# Patient Record
Sex: MALE | Race: WHITE
[De-identification: names, ages, dates, MRNs, and addresses within clinical notes are randomized per-mention and may not be internally consistent; named-entity substitution may affect disease eponyms.]

---

## 2018-01-08 NOTE — NUR
Patient discharged with v/s stable. Written and verbal after care instructions 
given and explained. 

Patient verbalized understanding. Transportation provided by [employer]. Pt exited ER wheel chaired assisted. All questions addressed 
prior to discharge. Advised to follow up with PMD.

## 2018-01-08 NOTE — NUR
Received report from Jenn TOLLIVER. Pt aox3, per report pt at baseline. hx of 
dementia. Pt with no complaints. VSS. NAD. Xray by bedside.

## 2018-01-08 NOTE — NUR
77/M BIBA FROM [facility] S/P MECHANICAL FALL. DENIES LOC/HEAD TRAUMA. ABRASIONS TO LT 
ELBOW AND LT KNEE, LT 2ND TOENAIL IS OFF. GCS 15, AO TO NAME, PLACE. 20RR EVEN 
AND UNLABORED. NO ACUTE DISTRESS AT THIS TIME

PMH: DEMENTIA, HTN

## 2019-01-22 ENCOUNTER — HOSPITAL ENCOUNTER (EMERGENCY)
Dept: HOSPITAL 26 - MED | Age: 79
Discharge: HOME | End: 2019-01-22
Payer: COMMERCIAL

## 2019-01-22 VITALS — HEIGHT: 71 IN | BODY MASS INDEX: 29.68 KG/M2 | WEIGHT: 212 LBS

## 2019-01-22 VITALS — DIASTOLIC BLOOD PRESSURE: 65 MMHG | SYSTOLIC BLOOD PRESSURE: 134 MMHG

## 2019-01-22 VITALS — DIASTOLIC BLOOD PRESSURE: 67 MMHG | SYSTOLIC BLOOD PRESSURE: 145 MMHG
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
## 2019-01-22 DIAGNOSIS — Z79.899: ICD-10-CM

## 2019-01-22 DIAGNOSIS — Z79.82: ICD-10-CM
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
## 2019-01-22 DIAGNOSIS — M79.89: ICD-10-CM

## 2019-01-22 DIAGNOSIS — M25.551: Primary | ICD-10-CM

## 2019-01-22 DIAGNOSIS — F03.90: ICD-10-CM

## 2019-01-22 DIAGNOSIS — I21.9: ICD-10-CM
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
## 2019-01-22 DIAGNOSIS — I10: ICD-10-CM

## 2019-01-22 DIAGNOSIS — Z90.49: ICD-10-CM

## 2019-01-22 PROCEDURE — 99283 EMERGENCY DEPT VISIT LOW MDM: CPT

## 2019-01-22 PROCEDURE — 71045 X-RAY EXAM CHEST 1 VIEW: CPT

## 2019-01-22 PROCEDURE — 96372 THER/PROPH/DIAG INJ SC/IM: CPT

## 2019-01-22 NOTE — NUR
Patient discharged with v/s stable. Written and verbal after care instructions 
given and explained. 

Patient alert, oriented and verbalized understanding of instructions. Ambulance 
Transport with to nursing home. All questions addressed prior to discharge. ID 
band removed. Patient advised to follow up with PMD. Rx of MOTRIN given. 
Patient educated on indication of medication including possible reaction and 
side effects. Opportunity to ask questions provided and answered.

## 2019-01-22 NOTE — NUR
PT IS A 79 Y/O MALE BIB EMS WHO PRESENTS TO THE ED C/O FLU. PT IS FROM 
[facility] AND REPORTS NOT FEELING WELL X3 DAYS. PT REPORTS 7/10 ACHING 
BILATERAL FOOT PAIN, NOTED SWELLING/REDNESS. PT REPORTS COUGH, 98% ON RA, PT 
DENIES CP, SOB, REPORTS NAUSEA DENIES VOMITING/DIARRHEA. PT AWAKE AND ALERT, RR 
EVEN/UNLABORED. PT REPOSITIONED FOR COMFORT, BED IN LOWEST POSITION. ER MD DR. LOPEZ NOTIFIED. WILL CONTINUE TO MONITOR. 



PMH---HEART DZ, HTN 

NKA

## 2019-01-22 NOTE — NUR
CONTACTED [facility]ERRY [facility]ARTY. FACILITY STATES NO ACCESS TO TRANSPORTATION AT THIS 
TIME. FAMILY [facility] CALLED. MESSAGE LEFT.

## 2019-01-22 NOTE — NUR
PT AWAKE IN BED, STATES HE FEELS ANXIOUS ABOUT WANTING TO LEAVE, PT REORIENTED, 
POSITIONED TO COMFORT, WILL CONTINUE TO MONITOR.

## 2019-02-23 ENCOUNTER — HOSPITAL ENCOUNTER (EMERGENCY)
Dept: HOSPITAL 26 - MED | Age: 79
Discharge: HOME | End: 2019-02-23
Payer: COMMERCIAL

## 2019-02-23 VITALS — DIASTOLIC BLOOD PRESSURE: 62 MMHG | SYSTOLIC BLOOD PRESSURE: 115 MMHG

## 2019-02-23 VITALS — DIASTOLIC BLOOD PRESSURE: 55 MMHG | SYSTOLIC BLOOD PRESSURE: 111 MMHG

## 2019-02-23 VITALS — HEIGHT: 71 IN | BODY MASS INDEX: 34.58 KG/M2 | WEIGHT: 247 LBS
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
## 2019-02-23 DIAGNOSIS — Z79.891: ICD-10-CM
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
## 2019-02-23 DIAGNOSIS — M25.511: ICD-10-CM

## 2019-02-23 DIAGNOSIS — Z79.899: ICD-10-CM

## 2019-02-23 DIAGNOSIS — S79.911A: Primary | ICD-10-CM

## 2019-02-23 DIAGNOSIS — Z79.2: ICD-10-CM
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
## 2019-02-23 DIAGNOSIS — Z79.82: ICD-10-CM

## 2019-02-23 DIAGNOSIS — I25.2: ICD-10-CM

## 2019-02-23 DIAGNOSIS — F03.90: ICD-10-CM

## 2019-02-23 DIAGNOSIS — I10: ICD-10-CM
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
## 2019-02-23 DIAGNOSIS — Z79.1: ICD-10-CM

## 2019-02-23 NOTE — NUR
SPOKE WITH TERRY LEE AND INFORMED THEM THAT RUPAL WAS ON HIS WAY BACK IN 
A TAXI AND WOULD NEED TO BE ASSISTED FROM CAB.

## 2019-02-23 NOTE — NUR
SPOKE WITH JEOVANNY RENEE, PATIENTS LISTED NEXT OF KIN, HE STATED HE COULD NOT 
PROVIDE TRANSPORT FOR MR ROBERT AT THIS TIME AND REQUESTED THAT WE CONTACT A 
TRANSPORT COMPANY.

## 2019-02-23 NOTE — NUR
SPOKE WITH PATIENT HOME FACILITY REGARDING PATIENT TRANSFER, TERRY LONG, 
THEY STATED THEY DO NOT PROVIDE TRANSPORT ON THE WEEKENDS. fair

## 2019-02-23 NOTE — NUR
Patient discharged with v/s stable. Written and verbal after care instructions 
given and explained. 

Patient verbalized understanding. Wheel Chair Assisted with to car. All 
questions addressed prior to discharge. Advised to follow up with PMD.

## 2019-02-23 NOTE — NUR
biba due to sp fall this morning while trying to stand; Denies LOC; c/o right 
arm and right hip pain;No deformity;(-) shortening of leg, bed rail x2, bed is 
lowered and locked, patient positioned for comfort, md notified of patients 
condition



Hx: dementia



Rx: ASA;norco; lorazepam

## 2019-04-13 ENCOUNTER — HOSPITAL ENCOUNTER (EMERGENCY)
Dept: HOSPITAL 26 - MED | Age: 79
Discharge: HOME | End: 2019-04-13
Payer: COMMERCIAL

## 2019-04-13 VITALS — WEIGHT: 247 LBS | BODY MASS INDEX: 34.58 KG/M2 | HEIGHT: 71 IN

## 2019-04-13 VITALS — SYSTOLIC BLOOD PRESSURE: 129 MMHG | DIASTOLIC BLOOD PRESSURE: 73 MMHG

## 2019-04-13 VITALS — SYSTOLIC BLOOD PRESSURE: 131 MMHG | DIASTOLIC BLOOD PRESSURE: 78 MMHG

## 2019-04-13 DIAGNOSIS — B35.6: ICD-10-CM

## 2019-04-13 DIAGNOSIS — I10: ICD-10-CM

## 2019-04-13 DIAGNOSIS — I89.0: Primary | ICD-10-CM

## 2019-04-13 DIAGNOSIS — I25.2: ICD-10-CM

## 2019-04-13 DIAGNOSIS — Z79.899: ICD-10-CM

## 2019-04-13 DIAGNOSIS — F03.90: ICD-10-CM

## 2019-04-13 DIAGNOSIS — Z79.891: ICD-10-CM

## 2019-04-13 DIAGNOSIS — Z79.1: ICD-10-CM

## 2019-04-13 DIAGNOSIS — E66.01: ICD-10-CM

## 2019-04-13 LAB
ALBUMIN FLD-MCNC: 3.1 G/DL (ref 3.4–5)
ANION GAP SERPL CALCULATED.3IONS-SCNC: 13.3 MMOL/L (ref 8–16)
APPEARANCE UR: CLEAR
AST SERPL-CCNC: 15 U/L (ref 15–37)
BASOPHILS # BLD AUTO: 0 K/UL (ref 0–0.22)
BASOPHILS NFR BLD AUTO: 0.4 % (ref 0–2)
BILIRUB SERPL-MCNC: 0.6 MG/DL (ref 0–1)
BILIRUB UR QL STRIP: NEGATIVE
BUN SERPL-MCNC: 15 MG/DL (ref 7–18)
CHLORIDE SERPL-SCNC: 104 MMOL/L (ref 98–107)
CO2 SERPL-SCNC: 29.8 MMOL/L (ref 21–32)
COLOR UR: YELLOW
CREAT SERPL-MCNC: 1.2 MG/DL (ref 0.7–1.3)
DEPRECATED D DIMER PPP-ACNC: 881 NG/ML (ref 0–400)
EOSINOPHIL # BLD AUTO: 0.3 K/UL (ref 0–0.4)
EOSINOPHIL NFR BLD AUTO: 3.6 % (ref 0–4)
ERYTHROCYTE [DISTWIDTH] IN BLOOD BY AUTOMATED COUNT: 15.9 % (ref 11.6–13.7)
FIBRINOGEN PPP-MCNC: 96 MG/DL (ref 200–400)
GFR SERPL CREATININE-BSD FRML MDRD: (no result) ML/MIN (ref 90–?)
GLUCOSE SERPL-MCNC: 107 MG/DL (ref 74–106)
GLUCOSE UR STRIP-MCNC: NEGATIVE MG/DL
HCT VFR BLD AUTO: 36 % (ref 36–52)
HGB BLD-MCNC: 11.9 G/DL (ref 12–18)
HGB UR QL STRIP: NEGATIVE
KETONES TITR SERPL: NEGATIVE {TITER}
LEUKOCYTE ESTERASE UR QL STRIP: NEGATIVE
LYMPHOCYTES # BLD AUTO: 1.3 K/UL (ref 2–11.5)
LYMPHOCYTES NFR BLD AUTO: 14.3 % (ref 20.5–51.1)
MAGNESIUM SERPL-MCNC: 2 MG/DL (ref 1.8–2.4)
MCH RBC QN AUTO: 29 PG (ref 27–31)
MCHC RBC AUTO-ENTMCNC: 33 G/DL (ref 33–37)
MCV RBC AUTO: 86.6 FL (ref 80–94)
MONOCYTES # BLD AUTO: 0.7 K/UL (ref 0.8–1)
MONOCYTES NFR BLD AUTO: 7.8 % (ref 1.7–9.3)
NEUTROPHILS # BLD AUTO: 6.5 K/UL (ref 1.8–7.7)
NEUTROPHILS NFR BLD AUTO: 73.9 % (ref 42.2–75.2)
NITRITE UR QL STRIP: NEGATIVE
PH UR STRIP: 6 [PH] (ref 5–9)
PLATELET # BLD AUTO: 144 K/UL (ref 140–450)
POTASSIUM SERPL-SCNC: 4.1 MMOL/L (ref 3.5–5.1)
PROTHROMBIN TIME: 10.3 SECS (ref 10.8–13.4)
RBC # BLD AUTO: 4.16 MIL/UL (ref 4.2–6.1)
SODIUM SERPL-SCNC: 143 MMOL/L (ref 136–145)
URATE SERPL-MCNC: 7.2 MG/DL (ref 2.6–7.2)
WBC # BLD AUTO: 8.8 K/UL (ref 4.8–10.8)

## 2019-04-13 PROCEDURE — 80053 COMPREHEN METABOLIC PANEL: CPT

## 2019-04-13 PROCEDURE — 85379 FIBRIN DEGRADATION QUANT: CPT

## 2019-04-13 PROCEDURE — 36415 COLL VENOUS BLD VENIPUNCTURE: CPT

## 2019-04-13 PROCEDURE — 36600 WITHDRAWAL OF ARTERIAL BLOOD: CPT

## 2019-04-13 PROCEDURE — 81003 URINALYSIS AUTO W/O SCOPE: CPT

## 2019-04-13 PROCEDURE — 85610 PROTHROMBIN TIME: CPT

## 2019-04-13 PROCEDURE — 82803 BLOOD GASES ANY COMBINATION: CPT

## 2019-04-13 PROCEDURE — 71045 X-RAY EXAM CHEST 1 VIEW: CPT

## 2019-04-13 PROCEDURE — 84550 ASSAY OF BLOOD/URIC ACID: CPT

## 2019-04-13 PROCEDURE — 85384 FIBRINOGEN ACTIVITY: CPT

## 2019-04-13 PROCEDURE — 99284 EMERGENCY DEPT VISIT MOD MDM: CPT

## 2019-04-13 PROCEDURE — 93970 EXTREMITY STUDY: CPT

## 2019-04-13 PROCEDURE — 84484 ASSAY OF TROPONIN QUANT: CPT

## 2019-04-13 PROCEDURE — 82140 ASSAY OF AMMONIA: CPT

## 2019-04-13 PROCEDURE — 93005 ELECTROCARDIOGRAM TRACING: CPT

## 2019-04-13 PROCEDURE — 82550 ASSAY OF CK (CPK): CPT

## 2019-04-13 PROCEDURE — 85025 COMPLETE CBC W/AUTO DIFF WBC: CPT

## 2019-04-13 PROCEDURE — 83735 ASSAY OF MAGNESIUM: CPT

## 2019-04-13 PROCEDURE — 82009 KETONE BODYS QUAL: CPT

## 2019-04-13 PROCEDURE — 83036 HEMOGLOBIN GLYCOSYLATED A1C: CPT

## 2019-04-13 NOTE — NUR
BIBA W C/O BILAT FOOT PAIN & RASH TO BILAT UNDERARMS & TESTICLES X 1 WEEK. 
SWELLING TO LLE, CALF AREA WARM & RED. RED RASH UNDER BOTH ARMS, COVERED IN 
WHITE CREAM FROM CARE CENTER. RASH ON TESTICLES IS RED IN COLOR, NO 
SWELLING/OPEN WOUNDS NOTED ON TESTICLES. DENIES N/V/D; SKIN IS PINK/WARM/DRY; 
AAOX4 WITH UNSTEADY STEADY GAIT; LUNGS CLEAR BL; HR EVEN AND REGULAR; PT DENIES 
ANY FEVER, CP, SOB, OR COUGH AT THIS TIME; PATIENT STATES PAIN OF 0/10 AT THIS 
TIME; VSS; PATIENT POSITIONED FOR COMFORT; HOB ELEVATED; BEDRAILS UP X1; BED 
DOWN. ER MD MADE AWARE OF PT STATUS.

## 2019-04-13 NOTE — NUR
Patient discharged with v/s stable. Written and verbal after care instructions 
given and explained. 

Patient alert, oriented and verbalized understanding of instructions. Ambulance 
Transport with to nursing home. All questions addressed prior to discharge. ID 
band removed. Patient advised to follow up with PMD. Rx of SPECTAZOLE given. 
Patient educated on indication of medication including possible reaction and 
side effects. Opportunity to ask questions provided and answered.

## 2019-04-26 ENCOUNTER — HOSPITAL ENCOUNTER (INPATIENT)
Dept: HOSPITAL 26 - MED | Age: 79
LOS: 2 days | Discharge: HOME | DRG: 73 | End: 2019-04-28
Attending: GENERAL PRACTICE | Admitting: GENERAL PRACTICE
Payer: COMMERCIAL

## 2019-04-26 VITALS — DIASTOLIC BLOOD PRESSURE: 62 MMHG | SYSTOLIC BLOOD PRESSURE: 134 MMHG

## 2019-04-26 VITALS — SYSTOLIC BLOOD PRESSURE: 113 MMHG | DIASTOLIC BLOOD PRESSURE: 53 MMHG

## 2019-04-26 VITALS — SYSTOLIC BLOOD PRESSURE: 101 MMHG | DIASTOLIC BLOOD PRESSURE: 44 MMHG

## 2019-04-26 VITALS — SYSTOLIC BLOOD PRESSURE: 108 MMHG | DIASTOLIC BLOOD PRESSURE: 58 MMHG

## 2019-04-26 VITALS — HEIGHT: 71 IN | WEIGHT: 250 LBS | BODY MASS INDEX: 35 KG/M2

## 2019-04-26 VITALS — SYSTOLIC BLOOD PRESSURE: 121 MMHG | DIASTOLIC BLOOD PRESSURE: 56 MMHG
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
## 2019-04-26 DIAGNOSIS — M25.562: ICD-10-CM

## 2019-04-26 DIAGNOSIS — Z66: ICD-10-CM

## 2019-04-26 DIAGNOSIS — E44.1: ICD-10-CM

## 2019-04-26 DIAGNOSIS — Z80.9: ICD-10-CM

## 2019-04-26 DIAGNOSIS — Z82.49: ICD-10-CM

## 2019-04-26 DIAGNOSIS — G90.8: Primary | ICD-10-CM

## 2019-04-26 DIAGNOSIS — E78.5: ICD-10-CM

## 2019-04-26 DIAGNOSIS — I25.2: ICD-10-CM
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
## 2019-04-26 DIAGNOSIS — E83.51: ICD-10-CM

## 2019-04-26 DIAGNOSIS — M54.5: ICD-10-CM

## 2019-04-26 DIAGNOSIS — I25.10: ICD-10-CM

## 2019-04-26 DIAGNOSIS — I10: ICD-10-CM

## 2019-04-26 DIAGNOSIS — I21.9: ICD-10-CM
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
## 2019-04-26 DIAGNOSIS — Z79.82: ICD-10-CM

## 2019-04-26 DIAGNOSIS — Z79.899: ICD-10-CM

## 2019-04-26 DIAGNOSIS — F03.90: ICD-10-CM
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
## 2019-04-26 DIAGNOSIS — F43.9: ICD-10-CM

## 2019-04-26 DIAGNOSIS — D72.829: ICD-10-CM

## 2019-04-26 LAB
ALBUMIN FLD-MCNC: 3.3 G/DL (ref 3.4–5)
AMYLASE SERPL-CCNC: 35 U/L (ref 25–115)
ANION GAP SERPL CALCULATED.3IONS-SCNC: 12 MMOL/L (ref 8–16)
APPEARANCE UR: CLEAR
AST SERPL-CCNC: 31 U/L (ref 15–37)
BARBITURATES UR QL SCN: (no result) NG/ML
BASOPHILS # BLD AUTO: 0 K/UL (ref 0–0.22)
BASOPHILS NFR BLD AUTO: 0.3 % (ref 0–2)
BENZODIAZ UR QL SCN: (no result) NG/ML
BILIRUB SERPL-MCNC: 0.7 MG/DL (ref 0–1)
BILIRUB UR QL STRIP: NEGATIVE
BUN SERPL-MCNC: 19 MG/DL (ref 7–18)
BZE UR QL SCN: (no result) NG/ML
CANNABINOIDS UR QL SCN: (no result) NG/ML
CHLORIDE SERPL-SCNC: 106 MMOL/L (ref 98–107)
CHOLEST/HDLC SERPL: 2.6 {RATIO} (ref 1–4.5)
CO2 SERPL-SCNC: 24.9 MMOL/L (ref 21–32)
COLOR UR: YELLOW
CREAT SERPL-MCNC: 1.3 MG/DL (ref 0.7–1.3)
EOSINOPHIL # BLD AUTO: 0 K/UL (ref 0–0.4)
EOSINOPHIL NFR BLD AUTO: 0.3 % (ref 0–4)
ERYTHROCYTE [DISTWIDTH] IN BLOOD BY AUTOMATED COUNT: 15.8 % (ref 11.6–13.7)
GFR SERPL CREATININE-BSD FRML MDRD: (no result) ML/MIN (ref 90–?)
GLUCOSE SERPL-MCNC: 105 MG/DL (ref 74–106)
GLUCOSE UR STRIP-MCNC: NEGATIVE MG/DL
HCT VFR BLD AUTO: 36.9 % (ref 36–52)
HDLC SERPL-MCNC: 44 MG/DL (ref 40–60)
HGB BLD-MCNC: 12 G/DL (ref 12–18)
HGB UR QL STRIP: NEGATIVE
LDLC SERPL CALC-MCNC: 55 MG/DL (ref 60–100)
LEUKOCYTE ESTERASE UR QL STRIP: NEGATIVE
LIPASE SERPL-CCNC: 125 U/L (ref 73–393)
LYMPHOCYTES # BLD AUTO: 1.5 K/UL (ref 2–11.5)
LYMPHOCYTES NFR BLD AUTO: 13.4 % (ref 20.5–51.1)
MAGNESIUM SERPL-MCNC: 2.1 MG/DL (ref 1.8–2.4)
MCH RBC QN AUTO: 28 PG (ref 27–31)
MCHC RBC AUTO-ENTMCNC: 33 G/DL (ref 33–37)
MCV RBC AUTO: 86 FL (ref 80–94)
MONOCYTES # BLD AUTO: 0.9 K/UL (ref 0.8–1)
MONOCYTES NFR BLD AUTO: 8.4 % (ref 1.7–9.3)
NEUTROPHILS # BLD AUTO: 8.7 K/UL (ref 1.8–7.7)
NEUTROPHILS NFR BLD AUTO: 77.6 % (ref 42.2–75.2)
NITRITE UR QL STRIP: NEGATIVE
OPIATES UR QL SCN: (no result) NG/ML
PCP UR QL SCN: (no result) NG/ML
PH UR STRIP: 6 [PH] (ref 5–9)
PHOSPHATE SERPL-MCNC: 2.5 MG/DL (ref 2.5–4.9)
PLATELET # BLD AUTO: 153 K/UL (ref 140–450)
POTASSIUM SERPL-SCNC: 3.9 MMOL/L (ref 3.5–5.1)
PROTHROMBIN TIME: 10.4 SECS (ref 10.8–13.4)
RBC # BLD AUTO: 4.29 MIL/UL (ref 4.2–6.1)
SODIUM SERPL-SCNC: 139 MMOL/L (ref 136–145)
T4 FREE SERPL-MCNC: 1.02 NG/DL (ref 0.76–1.46)
TRIGL SERPL-MCNC: 83 MG/DL (ref 30–150)
TSH SERPL DL<=0.05 MIU/L-ACNC: 2.5 UIU/ML (ref 0.34–3.74)
WBC # BLD AUTO: 11.2 K/UL (ref 4.8–10.8)

## 2019-04-26 RX ADMIN — CYCLOBENZAPRINE HYDROCHLORIDE SCH MG: 10 TABLET, FILM COATED ORAL at 17:16

## 2019-04-26 RX ADMIN — SODIUM CHLORIDE SCH MLS/HR: 9 INJECTION, SOLUTION INTRAVENOUS at 10:14

## 2019-04-26 RX ADMIN — HEPARIN SODIUM SCH MLS/HR: 5000 INJECTION, SOLUTION INTRAVENOUS at 18:08

## 2019-04-26 RX ADMIN — Medication SCH MG: at 20:15

## 2019-04-26 RX ADMIN — ATORVASTATIN CALCIUM SCH MG: 20 TABLET, FILM COATED ORAL at 20:15

## 2019-04-26 RX ADMIN — CYCLOBENZAPRINE HYDROCHLORIDE SCH MG: 10 TABLET, FILM COATED ORAL at 13:49

## 2019-04-26 NOTE — NUR
78 YEAR OLD MALE PT. ON HEPARIN DRIP AT 9 ML PER HOUR TO RIGHT HAND. NO INFILTRATION . ABLE 
TO VERBALIZE SIMPLE NEEDS. CALL LIGHT WITH IN REACH. TELEMETRY MONITORING. NOTED WITH 
FORGETFULNESS. NEEDS WILL BE ANTICIPATED AND WILL BE MET. SKIN INTACT .

## 2019-04-26 NOTE — NUR
HEPARIN DRIP STARTED AS PER PHARMACY ACS PROTOCOL. PATIENT TOLERATED WELL. WILL CONTINUE TO 
MONITOR.

## 2019-04-26 NOTE — NUR
PATIENT LYING DOWN IN BED GETTING AN ECHOCARDIOGRAM PERFORMED. SCHEDULED MEDICATIONS DUE 
GIVEN. WILL CONTINUE TO MONITOR.

## 2019-04-26 NOTE — NUR
BIBA AFTER A FALL AT [facility], WITNESSED BY STAFF.EMS REPORTS HE WAS 
WALKING WITH HIS WALKER FROM THE BATHROOM AND FELL TO HIS KNEES AND THEN FOWARD 
ONTO HIS STOMACH. REPORTS LOWER BACK PAIN AND NECK PAIN. ERMD AWARE OF STATUS.

## 2019-04-26 NOTE — NUR
Pt addmitted to tele floor RM 110A, report given to SHAREE Steven at approx [phone]. 
Transfer of care at this time.

## 2019-04-26 NOTE — NUR
PT. SLEEPING. WOKE UP WHEN TOUCHED TO GET VITAL SIGNS. CALL LIGHT WITH IN REACH. TELEMETRY 
MONITORING.

## 2019-04-26 NOTE — NUR
ALL P.O. MEDICATIONS TOLERATED WELL. PT. HAS EPISODES OF FORGETFULNESS. ABLE TO VERBALIZE 
SIMPLE NEEDS. TELEMETRY MONITORING. NO SOB.  MEDICATED WITH NORCO RT C/O GENERALIZED PAIN. 
KEPT COMFORTABLE AND DRY. GOWN CHANGED RT URINATED IN BED.  ABLE TO USE CALL LIGHT FOR HELP 
OR CALLS OUT NURSES LOUDLY.  IVF SITES TO RIGHT AND LEFT HAND INTACT AND NO INFILTRATION. 
GOOD BLOOD RETURNS TO BOTH SITES.

## 2019-04-26 NOTE — NUR
PATIENT SLEEPING IN BED COMFORTABLY. NO SIGNS OF DISTRESS AT THIS TIME. IVF RUNNING PER MD 
ORDER. WILL CONTINUE TO MONITOR.

## 2019-04-27 VITALS — SYSTOLIC BLOOD PRESSURE: 113 MMHG | DIASTOLIC BLOOD PRESSURE: 51 MMHG

## 2019-04-27 VITALS — SYSTOLIC BLOOD PRESSURE: 104 MMHG | DIASTOLIC BLOOD PRESSURE: 60 MMHG

## 2019-04-27 VITALS — DIASTOLIC BLOOD PRESSURE: 62 MMHG | SYSTOLIC BLOOD PRESSURE: 122 MMHG

## 2019-04-27 VITALS — DIASTOLIC BLOOD PRESSURE: 50 MMHG | SYSTOLIC BLOOD PRESSURE: 129 MMHG

## 2019-04-27 VITALS — DIASTOLIC BLOOD PRESSURE: 51 MMHG | SYSTOLIC BLOOD PRESSURE: 109 MMHG

## 2019-04-27 LAB
ANION GAP SERPL CALCULATED.3IONS-SCNC: 10.1 MMOL/L (ref 8–16)
BASOPHILS # BLD AUTO: 0 K/UL (ref 0–0.22)
BASOPHILS NFR BLD AUTO: 0.2 % (ref 0–2)
BUN SERPL-MCNC: 13 MG/DL (ref 7–18)
CHLORIDE SERPL-SCNC: 108 MMOL/L (ref 98–107)
CO2 SERPL-SCNC: 27.5 MMOL/L (ref 21–32)
CREAT SERPL-MCNC: 1.1 MG/DL (ref 0.7–1.3)
EOSINOPHIL # BLD AUTO: 0 K/UL (ref 0–0.4)
EOSINOPHIL NFR BLD AUTO: 0.5 % (ref 0–4)
ERYTHROCYTE [DISTWIDTH] IN BLOOD BY AUTOMATED COUNT: 15.9 % (ref 11.6–13.7)
GFR SERPL CREATININE-BSD FRML MDRD: (no result) ML/MIN (ref 90–?)
GLUCOSE SERPL-MCNC: 110 MG/DL (ref 74–106)
HCT VFR BLD AUTO: 33.9 % (ref 36–52)
HGB BLD-MCNC: 11.3 G/DL (ref 12–18)
LYMPHOCYTES # BLD AUTO: 1.3 K/UL (ref 2–11.5)
LYMPHOCYTES NFR BLD AUTO: 14.1 % (ref 20.5–51.1)
MCH RBC QN AUTO: 29 PG (ref 27–31)
MCHC RBC AUTO-ENTMCNC: 33 G/DL (ref 33–37)
MCV RBC AUTO: 86.2 FL (ref 80–94)
MONOCYTES # BLD AUTO: 0.6 K/UL (ref 0.8–1)
MONOCYTES NFR BLD AUTO: 6 % (ref 1.7–9.3)
NEUTROPHILS # BLD AUTO: 7.3 K/UL (ref 1.8–7.7)
NEUTROPHILS NFR BLD AUTO: 79.2 % (ref 42.2–75.2)
PLATELET # BLD AUTO: 129 K/UL (ref 140–450)
POTASSIUM SERPL-SCNC: 3.6 MMOL/L (ref 3.5–5.1)
RBC # BLD AUTO: 3.93 MIL/UL (ref 4.2–6.1)
SODIUM SERPL-SCNC: 142 MMOL/L (ref 136–145)
WBC # BLD AUTO: 9.2 K/UL (ref 4.8–10.8)

## 2019-04-27 RX ADMIN — HEPARIN SODIUM SCH MLS/HR: 5000 INJECTION, SOLUTION INTRAVENOUS at 00:30

## 2019-04-27 RX ADMIN — Medication SCH MG: at 09:58

## 2019-04-27 RX ADMIN — HEPARIN SODIUM SCH MLS/HR: 5000 INJECTION, SOLUTION INTRAVENOUS at 10:13

## 2019-04-27 RX ADMIN — ASPIRIN TAB DELAYED RELEASE 81 MG SCH MG: 81 TABLET DELAYED RESPONSE at 09:58

## 2019-04-27 RX ADMIN — LISINOPRIL SCH MG: 5 TABLET ORAL at 09:59

## 2019-04-27 RX ADMIN — SODIUM CHLORIDE SCH MLS/HR: 9 INJECTION, SOLUTION INTRAVENOUS at 02:36

## 2019-04-27 RX ADMIN — SODIUM CHLORIDE SCH MLS/HR: 9 INJECTION, SOLUTION INTRAVENOUS at 17:50

## 2019-04-27 RX ADMIN — CYCLOBENZAPRINE HYDROCHLORIDE SCH MG: 10 TABLET, FILM COATED ORAL at 10:00

## 2019-04-27 RX ADMIN — Medication SCH MG: at 20:22

## 2019-04-27 RX ADMIN — ATORVASTATIN CALCIUM SCH MG: 20 TABLET, FILM COATED ORAL at 20:25

## 2019-04-27 NOTE — NUR
PTT 50.7 AT THIS TIME AND NO RATE CHANGES IN HEPARIN DRIP. STILL AT AT SAME DRIP. PT. 
SLEEPING AT THIS TIME. NEEDS ANTICIPATED WILL BE MET.

## 2019-04-27 NOTE — NUR
RECEIVED BEDSIDE REPORT FROM DAY SHIFT RN, PATIENT IN BED ON RA, FALL PRECAUTIONS IN PLACE, 
IV IN RIGHT HAND INFUSING HEPARIN AT 9 ML/HR. DRESSING INTACT. IV IN LEFT HAND BLEEDING, 
PATIENT C/O ITS HURTS, D/C IV, CATH INTACT, EXPLAINED NEED FOR NEW IV TO GIVE NS, PATIENT 
STATED "NOT RIGHT NOW, IM TIRED", WILL INSERT IV AT LATER TIME. CALL LIGHT WITHIN REACH WILL 
CONTINUE TO MONITOR

## 2019-04-27 NOTE — NUR
PATIENT IS SLEEPING COMFORTABLY. EASILY AROUSABLE, RESPIRATIONS ARE EVEN AND UNLABORED.NO 
SIGNS OF DISTRESS AT THIS TIME. DENIES ANY PAIN. WILL CONTINUE TO MONITOR.

## 2019-04-27 NOTE — NUR
SLEEPING WELL THIS SHIFT. WAKES UP EASILY WHEN TOUCHED. NEEDS ANTICIPATED AND MET. TELEMETRY 
MONITORING.

## 2019-04-27 NOTE — NUR
RECEIVED REPORT FROM NIGHT SHIFT NURSE. PATIENT IS SITTING IN BED. RESPIRATIONS ARE EVEN AND 
UNLABORED ON ROOM AIR. AWAKE ALERT AND ORIENTED X2. NO SIGNS OF DISTRESS, DENIES ANY 
PAIN.RIGHT LATERAL THIGH BRUISE NOTED. IV SITES INTACT, PATENT AND INFUSING IVF AS PER MD 
ORDERS. REVIEWED PLAN OF CARE WITH PATIENT, REINFORCEMENT NEEDED. SAFETY MEASURES IN PLACE, 
CALL LIGHT WITHIN REACH. WILL CONTINUE TO MONITOR.

## 2019-04-28 VITALS — DIASTOLIC BLOOD PRESSURE: 77 MMHG | SYSTOLIC BLOOD PRESSURE: 136 MMHG

## 2019-04-28 VITALS — SYSTOLIC BLOOD PRESSURE: 112 MMHG | DIASTOLIC BLOOD PRESSURE: 64 MMHG

## 2019-04-28 VITALS — SYSTOLIC BLOOD PRESSURE: 90 MMHG | DIASTOLIC BLOOD PRESSURE: 57 MMHG

## 2019-04-28 LAB
ANION GAP SERPL CALCULATED.3IONS-SCNC: 11.8 MMOL/L (ref 8–16)
BASOPHILS # BLD AUTO: 0 K/UL (ref 0–0.22)
BASOPHILS NFR BLD AUTO: 0.5 % (ref 0–2)
BUN SERPL-MCNC: 14 MG/DL (ref 7–18)
CHLORIDE SERPL-SCNC: 108 MMOL/L (ref 98–107)
CO2 SERPL-SCNC: 25.9 MMOL/L (ref 21–32)
CREAT SERPL-MCNC: 1.1 MG/DL (ref 0.7–1.3)
EOSINOPHIL # BLD AUTO: 0.4 K/UL (ref 0–0.4)
EOSINOPHIL NFR BLD AUTO: 4.7 % (ref 0–4)
ERYTHROCYTE [DISTWIDTH] IN BLOOD BY AUTOMATED COUNT: 16.3 % (ref 11.6–13.7)
GFR SERPL CREATININE-BSD FRML MDRD: (no result) ML/MIN (ref 90–?)
GLUCOSE SERPL-MCNC: 97 MG/DL (ref 74–106)
HCT VFR BLD AUTO: 36.4 % (ref 36–52)
HGB BLD-MCNC: 11.9 G/DL (ref 12–18)
LYMPHOCYTES # BLD AUTO: 1.5 K/UL (ref 2–11.5)
LYMPHOCYTES NFR BLD AUTO: 16.7 % (ref 20.5–51.1)
MCH RBC QN AUTO: 28 PG (ref 27–31)
MCHC RBC AUTO-ENTMCNC: 33 G/DL (ref 33–37)
MCV RBC AUTO: 86.8 FL (ref 80–94)
MONOCYTES # BLD AUTO: 0.6 K/UL (ref 0.8–1)
MONOCYTES NFR BLD AUTO: 7 % (ref 1.7–9.3)
NEUTROPHILS # BLD AUTO: 6.2 K/UL (ref 1.8–7.7)
NEUTROPHILS NFR BLD AUTO: 71.1 % (ref 42.2–75.2)
PLATELET # BLD AUTO: 129 K/UL (ref 140–450)
POTASSIUM SERPL-SCNC: 3.7 MMOL/L (ref 3.5–5.1)
RBC # BLD AUTO: 4.19 MIL/UL (ref 4.2–6.1)
SODIUM SERPL-SCNC: 142 MMOL/L (ref 136–145)
WBC # BLD AUTO: 8.8 K/UL (ref 4.8–10.8)

## 2019-04-28 RX ADMIN — Medication SCH MG: at 09:00

## 2019-04-28 RX ADMIN — SODIUM CHLORIDE SCH MLS/HR: 9 INJECTION, SOLUTION INTRAVENOUS at 10:30

## 2019-04-28 RX ADMIN — LISINOPRIL SCH MG: 5 TABLET ORAL at 09:00

## 2019-04-28 RX ADMIN — ASPIRIN TAB DELAYED RELEASE 81 MG SCH MG: 81 TABLET DELAYED RESPONSE at 09:02

## 2019-04-28 NOTE — NUR
Spoke to Mary from [facility] re: discharge order for today. Per Mary, no transport 
service available to  pt. Charge nurse aware. Left voicemail to Remington (friend) re: 
discharge order.

## 2019-04-28 NOTE — NUR
Pt discharged to [facility] at this time. Pt stable, no signs of distress. Pt left 
unit via gurney with 2EMTs from [facility] transport. All belongings with pt upon departure.

## 2019-04-28 NOTE — NUR
Pt refused blood draw again at this time. Dr. Spears notified. Pt cont on heparin drip @ 
900unit (9ml)/hr to R hand IV. No signs of bleeding or distress. Call light within reach. 
Bed alarm on.

## 2019-04-28 NOTE — NUR
Received report from pm nurse. Pt awake, verbally responsive, no signs of distress, 
respirations even & nonlabored. R hand IV intact with ongoing heparin drip @ 900 unit (9 
ml)/hr. L hand IV intact with ongoing NS @ 60ml/hr. Call light within reach. Bed alarm on.

## 2019-04-28 NOTE — NUR
Spoke to Chayo from [facility]. Informed of Rx scripts to be sent with pt at 
discharge. Per Chayo, they will order meds for pt. Will also f/u with Remington (friend) re: 
outpatient physician f/u. Right hand & Left hand IV discontinued, cannula intact, no signs 
of bleeding.

## 2019-04-28 NOTE — NUR
PATIENT TRYING TO GET OUT OF BED, STATED HE FORGOT WHERE HE WAS, ASSISTED BACK INTO BED, BED 
ALARM ON

## 2019-04-28 NOTE — NUR
PATIENT REFUSED LAB DRAW, DR FRANKLIN WENT TO TALK TO PATIENT AND EXPLAIN NEED FOR LABS, 
PATIENT STILL REFUSED. ASKED DR FRANKLIN IF WANTED TO PAUSE HEPARIN, TOLD TO CONTINUE TO 
INFUSE AND TRY TO GET LABS LATER.

## 2019-04-28 NOTE — NUR
PATIENT GETTING OUT OF BED, YELLING FOR HELP, C/O OF ANXIETY WANTS TO SPEAK WITH DR, CALLED 
DR MARKHAM TO SEE PATIENT.

## 2019-04-28 NOTE — NUR
Heparin drip discontinued at this time. Right hand IV heplock asymptomatic. Pt denies any 
pain or discomfort. Call light within reach. Bed alarm on.

## 2019-04-29 LAB
T3RU NFR SERPL: 33 % (ref 24–39)
T4 SERPL-MCNC: 7.1 UG/DL (ref 4.5–12)

## 2019-10-01 ENCOUNTER — HOSPITAL ENCOUNTER (INPATIENT)
Dept: HOSPITAL 26 - MED | Age: 79
LOS: 3 days | DRG: 871 | End: 2019-10-04
Attending: GENERAL PRACTICE | Admitting: GENERAL PRACTICE
Payer: COMMERCIAL

## 2019-10-01 VITALS
DIASTOLIC BLOOD PRESSURE: 46 MMHG | WEIGHT: 194 LBS | BODY MASS INDEX: 27.16 KG/M2 | HEIGHT: 71 IN | SYSTOLIC BLOOD PRESSURE: 103 MMHG

## 2019-10-01 VITALS — SYSTOLIC BLOOD PRESSURE: 115 MMHG | DIASTOLIC BLOOD PRESSURE: 65 MMHG

## 2019-10-01 VITALS — DIASTOLIC BLOOD PRESSURE: 74 MMHG | SYSTOLIC BLOOD PRESSURE: 168 MMHG

## 2019-10-01 VITALS — SYSTOLIC BLOOD PRESSURE: 106 MMHG | DIASTOLIC BLOOD PRESSURE: 49 MMHG

## 2019-10-01 VITALS — SYSTOLIC BLOOD PRESSURE: 123 MMHG | DIASTOLIC BLOOD PRESSURE: 35 MMHG

## 2019-10-01 VITALS — DIASTOLIC BLOOD PRESSURE: 52 MMHG | SYSTOLIC BLOOD PRESSURE: 93 MMHG

## 2019-10-01 DIAGNOSIS — Z95.5: ICD-10-CM

## 2019-10-01 DIAGNOSIS — N17.0: ICD-10-CM

## 2019-10-01 DIAGNOSIS — E83.39: ICD-10-CM

## 2019-10-01 DIAGNOSIS — Z82.49: ICD-10-CM

## 2019-10-01 DIAGNOSIS — G93.41: ICD-10-CM

## 2019-10-01 DIAGNOSIS — Z80.9: ICD-10-CM

## 2019-10-01 DIAGNOSIS — F29: ICD-10-CM

## 2019-10-01 DIAGNOSIS — Z66: ICD-10-CM

## 2019-10-01 DIAGNOSIS — J69.0: ICD-10-CM

## 2019-10-01 DIAGNOSIS — G47.00: ICD-10-CM

## 2019-10-01 DIAGNOSIS — E87.8: ICD-10-CM

## 2019-10-01 DIAGNOSIS — E78.5: ICD-10-CM

## 2019-10-01 DIAGNOSIS — F03.90: ICD-10-CM

## 2019-10-01 DIAGNOSIS — I10: ICD-10-CM

## 2019-10-01 DIAGNOSIS — I21.A1: ICD-10-CM

## 2019-10-01 DIAGNOSIS — Z79.82: ICD-10-CM

## 2019-10-01 DIAGNOSIS — L89.152: ICD-10-CM

## 2019-10-01 DIAGNOSIS — I46.9: ICD-10-CM

## 2019-10-01 DIAGNOSIS — F41.9: ICD-10-CM

## 2019-10-01 DIAGNOSIS — Z51.5: ICD-10-CM

## 2019-10-01 DIAGNOSIS — R65.20: ICD-10-CM

## 2019-10-01 DIAGNOSIS — D64.9: ICD-10-CM

## 2019-10-01 DIAGNOSIS — Z79.899: ICD-10-CM

## 2019-10-01 DIAGNOSIS — E87.0: ICD-10-CM

## 2019-10-01 DIAGNOSIS — A41.9: Primary | ICD-10-CM

## 2019-10-01 DIAGNOSIS — Z86.73: ICD-10-CM

## 2019-10-01 DIAGNOSIS — E86.0: ICD-10-CM

## 2019-10-01 DIAGNOSIS — I25.2: ICD-10-CM

## 2019-10-01 DIAGNOSIS — E87.5: ICD-10-CM

## 2019-10-01 DIAGNOSIS — E83.41: ICD-10-CM

## 2019-10-01 DIAGNOSIS — I25.10: ICD-10-CM

## 2019-10-01 LAB
ALBUMIN FLD-MCNC: 2.9 G/DL (ref 3.4–5)
ALBUMIN FLD-MCNC: 3.5 G/DL (ref 3.4–5)
AMYLASE SERPL-CCNC: 74 U/L (ref 25–115)
ANION GAP SERPL CALCULATED.3IONS-SCNC: 22.2 MMOL/L (ref 8–16)
ANION GAP SERPL CALCULATED.3IONS-SCNC: 29.5 MMOL/L (ref 8–16)
APPEARANCE UR: CLEAR
AST SERPL-CCNC: 16 U/L (ref 15–37)
BASOPHILS # BLD AUTO: 0 K/UL (ref 0–0.22)
BASOPHILS NFR BLD AUTO: 0.1 % (ref 0–2)
BILIRUB SERPL-MCNC: 0.7 MG/DL (ref 0–1)
BILIRUB UR QL STRIP: (no result)
BUN SERPL-MCNC: 169 MG/DL (ref 7–18)
BUN SERPL-MCNC: 171 MG/DL (ref 7–18)
CHLORIDE SERPL-SCNC: 118 MMOL/L (ref 98–107)
CHLORIDE SERPL-SCNC: 122 MMOL/L (ref 98–107)
CHOLEST/HDLC SERPL: 3.5 {RATIO} (ref 1–4.5)
CO2 SERPL-SCNC: 15.1 MMOL/L (ref 21–32)
CO2 SERPL-SCNC: 21.3 MMOL/L (ref 21–32)
COLOR UR: YELLOW
CREAT SERPL-MCNC: 9.3 MG/DL (ref 0.7–1.3)
CREAT SERPL-MCNC: 9.3 MG/DL (ref 0.7–1.3)
EOSINOPHIL # BLD AUTO: 0 K/UL (ref 0–0.4)
EOSINOPHIL NFR BLD AUTO: 0 % (ref 0–4)
ERYTHROCYTE [DISTWIDTH] IN BLOOD BY AUTOMATED COUNT: 16 % (ref 11.6–13.7)
GFR SERPL CREATININE-BSD FRML MDRD: (no result) ML/MIN (ref 90–?)
GFR SERPL CREATININE-BSD FRML MDRD: (no result) ML/MIN (ref 90–?)
GLUCOSE SERPL-MCNC: 86 MG/DL (ref 74–106)
GLUCOSE SERPL-MCNC: 93 MG/DL (ref 74–106)
GLUCOSE UR STRIP-MCNC: NEGATIVE MG/DL
HCT VFR BLD AUTO: 38.6 % (ref 36–52)
HDLC SERPL-MCNC: 33 MG/DL (ref 40–60)
HGB BLD-MCNC: 12.1 G/DL (ref 12–18)
HGB UR QL STRIP: NEGATIVE
LDLC SERPL CALC-MCNC: 51 MG/DL (ref 60–100)
LEUKOCYTE ESTERASE UR QL STRIP: NEGATIVE
LIPASE SERPL-CCNC: 279 U/L (ref 73–393)
LYMPHOCYTES # BLD AUTO: 1.1 K/UL (ref 2–11.5)
LYMPHOCYTES NFR BLD AUTO: 6.6 % (ref 20.5–51.1)
MAGNESIUM SERPL-MCNC: 2.8 MG/DL (ref 1.8–2.4)
MCH RBC QN AUTO: 30 PG (ref 27–31)
MCHC RBC AUTO-ENTMCNC: 31 G/DL (ref 33–37)
MCV RBC AUTO: 95.3 FL (ref 80–94)
MONOCYTES # BLD AUTO: 0.8 K/UL (ref 0.8–1)
MONOCYTES NFR BLD AUTO: 4.9 % (ref 1.7–9.3)
NEUTROPHILS # BLD AUTO: 14.6 K/UL (ref 1.8–7.7)
NEUTROPHILS NFR BLD AUTO: 88.4 % (ref 42.2–75.2)
NITRITE UR QL STRIP: NEGATIVE
PH UR STRIP: 5.5 [PH] (ref 5–9)
PHOSPHATE SERPL-MCNC: 6.2 MG/DL (ref 2.5–4.9)
PLATELET # BLD AUTO: 169 K/UL (ref 140–450)
POTASSIUM SERPL-SCNC: 5.5 MMOL/L (ref 3.5–5.1)
POTASSIUM SERPL-SCNC: 5.6 MMOL/L (ref 3.5–5.1)
PROTHROMBIN TIME: 10.9 SECS (ref 10.8–13.4)
RBC # BLD AUTO: 4.05 MIL/UL (ref 4.2–6.1)
RBC #/AREA URNS HPF: (no result) /HPF (ref 0–5)
SODIUM SERPL-SCNC: 157 MMOL/L (ref 136–145)
SODIUM SERPL-SCNC: 160 MMOL/L (ref 136–145)
T4 FREE SERPL-MCNC: 0.93 NG/DL (ref 0.76–1.46)
TRIGL SERPL-MCNC: 161 MG/DL (ref 30–150)
TSH SERPL DL<=0.05 MIU/L-ACNC: 2.06 UIU/ML (ref 0.34–3.74)
WBC # BLD AUTO: 16.6 K/UL (ref 4.8–10.8)
WBC,URINE: (no result) /HPF (ref 0–5)

## 2019-10-01 PROCEDURE — A4649 SURGICAL SUPPLIES: HCPCS

## 2019-10-01 RX ADMIN — WHITE PETROLATUM SCH EA: 57 PASTE TOPICAL at 15:52

## 2019-10-01 RX ADMIN — TEMAZEPAM SCH MG: 15 CAPSULE ORAL at 22:28

## 2019-10-01 RX ADMIN — Medication SCH MG: at 22:30

## 2019-10-01 RX ADMIN — DEXTROSE SCH MLS/HR: 50 INJECTION, SOLUTION INTRAVENOUS at 22:27

## 2019-10-01 RX ADMIN — SODIUM CHLORIDE SCH MLS/HR: 0.45 INJECTION, SOLUTION INTRAVENOUS at 22:29

## 2019-10-01 NOTE — NUR
RECEIVED PATIENT TRANSFERRED FROM ER, BEDSIDE REPORT OBTAINED, PT IS DROWSY, UNABLE TO 
FOLLOW COMMANDS, VSS, FLACC 0. NO S/S OF DISTRESS, DIMINISHED LUNG SOUNDS JESUS ALBERTO, ON O2 AT 2L 
VIA NC, O2 SAT 93%, ST ON CARDIAC MONITOR, CAP REFILL < 3 SEC, SOFT ROUND ABDOMEN WITH 
ACTIVE BOWEL SOUNDS, BARRON CATHETER IN PLACE WITH CLEAR YELLOW URINE VIA GRAVITY, 
GENERALIZED WEAKNESS TO ALL EXTREMITIES NOTED, SKIN IS WARM AND DRY TO TOUCH,  IV SITE TO 
RIGHT AC  20 GA, PATENT AND SL, IV TO LEFT FOREARM 20GA, PATENT AND SL. HOB ELEVATED, SAFETY 
MEASURES IN PLACE, WILL CONTINUE TO MONITOR.

## 2019-10-01 NOTE — NUR
Patient will be admitted to care of  DR VILLANUEVA. Admited to ICU.  Will go to room 
1. Belongings list completed.  Report to  YUMIKO TOLLIVER.

## 2019-10-01 NOTE — NUR
PM CARE AND BARRON CATHETER CARE PROVIDED, PT OPEN EYES BUT STILL UNABLE TO FOLLOW COMMANDS, 
VSS, FLACC 0, POSITION CHANGED FOR OFF LOAD PRESSURE.

## 2019-10-01 NOTE — NUR
SONIDO FROM [facility] FOR DECREASE IN MENTAL STATUS FOR THE PAST COUPLE OF 
DAYS. SKIN REDNESS AT COCCYX & SMALL ABRASION WOUND AT LEFT KNEE.HX DEMENTIA, 
HTN, CAD. PATIENT POSITIONED FOR COMFORT; HOB ELEVATED; BEDRAILS UP X2; BED 
DOWN. ER MD MADE AWARE OF PT STATUS.

## 2019-10-01 NOTE — NUR
UNABLE TO OBTAIN ABG AT THIS TIME DUE TO PT BEING COMBATIVE INFORMED DR. MOON AND HE 
WILL CALL BACK TO RETRY AFTER PT IS GIVEN SOME SEDATION

## 2019-10-01 NOTE — NUR
CHANGE OF SHIFT REPORT GIVEN AT BEDSIDE BY DAY NURSE. STATES MD HAS HX OF DEMENTIA. RT IN 
ROOM TRYING TO RETRIEVE BLOOD FROM LEFT ARM. PATIENT LOCALIZING TO PAIN. TRYING TO PUSH RTS 
HAND AWAY. EXPLAINED TO PATIENT WHAT RT WAS DOING AND WHY. PATIENT MUMBLING. HX OF 
EXTREMITIES SHAKING AT TIMES WHEN TRYING TO MOVE. UNAWARE IF PATIENT HAS HX OF PARKINSONS 
PER RECORDS. PATIENT HAS SCAB ON RIGHT TOP OF KNEE AND SKIN TEAR/ABRASION ON LEFT TOP OF 
KNEE THAT IS SIZE OF AN AMERICAN QUARTER. COVERED WITH A CLEAR DRESSING. NO DRAINAGE. 
PATIENT HAS SACRAL DRESSING ON AREA. DRESSING INTACT. SKIN DRY AT THIS TIME. PATIENT ON 
NASAL CANNULA 2L AND TOLERATING WELL. LUNG SOUNDS CLEAR. HR REGULAR.  PATIENT RESPONDS TO 
NAME. CALMS DOWN WHEN EXPLAINED THINGS TO AND WHEN HOLDS HANDS. PERRL BILATERALLY. SIDE 
RAILS UP BED IN LOWEST POSITION. WILL CONTINUE TO MONITOR

## 2019-10-01 NOTE — NUR
PO MEDICATION UNABLE TO GIVEN AT THIS TIME DUE TO PT IS DROWSY, UNABLE TO FOLLOW COMMANDS, 
DR. FOX MADE AWARE.

## 2019-10-01 NOTE — NUR
DR. GONZALES CAME IN TO SEE PATIENT, WILL FOLLOW UP WITH NEW ORDERS. 

-------------------------------------------------------------------------------

Addendum: 10/01/19 at 1724 by Mahendra Augustine RN

-------------------------------------------------------------------------------

PER DR. GONZALES NO NEED HEMODYLASIS AT THIS TIME DUE TO PATIENT'S CONDITION.

## 2019-10-01 NOTE — NUR
HEPARIN DRIP STARTED AS ORDERED, APTT 23.5 AT THIS TIME, INITIAL DOSE PER PHARMACY, WILL 
CONTINUE TO MONITOR.

## 2019-10-01 NOTE — NUR
NG TUBE INSERTED PER MD ORDERS BY CHARGE NURSE AT PATIENT BEDSIDE. PATIENT TOLERATED WELL. 
VSS. WILL CONTINUE TO MONITOR.

## 2019-10-01 NOTE — NUR
LESTER CALLED. CHARGE NURSE ANSWERED. TROPONIN [phone] CALLED LUISANA [phone] TO NOTIFY "OK". WILL 
CONTINUE TO MONITOR

## 2019-10-02 VITALS — SYSTOLIC BLOOD PRESSURE: 125 MMHG | DIASTOLIC BLOOD PRESSURE: 70 MMHG

## 2019-10-02 VITALS — SYSTOLIC BLOOD PRESSURE: 111 MMHG | DIASTOLIC BLOOD PRESSURE: 73 MMHG

## 2019-10-02 VITALS — DIASTOLIC BLOOD PRESSURE: 102 MMHG | SYSTOLIC BLOOD PRESSURE: 134 MMHG

## 2019-10-02 VITALS — DIASTOLIC BLOOD PRESSURE: 29 MMHG | SYSTOLIC BLOOD PRESSURE: 58 MMHG

## 2019-10-02 VITALS — DIASTOLIC BLOOD PRESSURE: 91 MMHG | SYSTOLIC BLOOD PRESSURE: 136 MMHG

## 2019-10-02 LAB
ANION GAP SERPL CALCULATED.3IONS-SCNC: 21.8 MMOL/L (ref 8–16)
BASOPHILS # BLD AUTO: 0 K/UL (ref 0–0.22)
BASOPHILS NFR BLD AUTO: 0.1 % (ref 0–2)
BUN SERPL-MCNC: 169 MG/DL (ref 7–18)
CHLORIDE SERPL-SCNC: 122 MMOL/L (ref 98–107)
CHLORIDE UR-SCNC: 36 MMOL/L (ref 110–250)
CO2 SERPL-SCNC: 20.7 MMOL/L (ref 21–32)
CREAT SERPL-MCNC: 8.7 MG/DL (ref 0.7–1.3)
CREAT UR-MCNC: 267 MG/DL (ref 30–125)
EOSINOPHIL # BLD AUTO: 0 K/UL (ref 0–0.4)
EOSINOPHIL NFR BLD AUTO: 0 % (ref 0–4)
ERYTHROCYTE [DISTWIDTH] IN BLOOD BY AUTOMATED COUNT: 16.2 % (ref 11.6–13.7)
FOLATE SERPL-MCNC: 9.1 NG/ML (ref 3–?)
GFR SERPL CREATININE-BSD FRML MDRD: (no result) ML/MIN (ref 90–?)
GLUCOSE SERPL-MCNC: 101 MG/DL (ref 74–106)
HCT VFR BLD AUTO: 29.7 % (ref 36–52)
HGB BLD-MCNC: 9.6 G/DL (ref 12–18)
LYMPHOCYTES # BLD AUTO: 1 K/UL (ref 2–11.5)
LYMPHOCYTES NFR BLD AUTO: 7.1 % (ref 20.5–51.1)
MAGNESIUM SERPL-MCNC: 2.7 MG/DL (ref 1.8–2.4)
MCH RBC QN AUTO: 31 PG (ref 27–31)
MCHC RBC AUTO-ENTMCNC: 32 G/DL (ref 33–37)
MCV RBC AUTO: 94.7 FL (ref 80–94)
MONOCYTES # BLD AUTO: 0.8 K/UL (ref 0.8–1)
MONOCYTES NFR BLD AUTO: 5.4 % (ref 1.7–9.3)
NEUTROPHILS # BLD AUTO: 12.3 K/UL (ref 1.8–7.7)
NEUTROPHILS NFR BLD AUTO: 87.4 % (ref 42.2–75.2)
PHOSPHATE SERPL-MCNC: 6.9 MG/DL (ref 2.5–4.9)
PLATELET # BLD AUTO: 111 K/UL (ref 140–450)
POTASSIUM SERPL-SCNC: 4.5 MMOL/L (ref 3.5–5.1)
PROTHROMBIN TIME: 12.1 SECS (ref 10.8–13.4)
RBC # BLD AUTO: 3.14 MIL/UL (ref 4.2–6.1)
SODIUM SERPL-SCNC: 160 MMOL/L (ref 136–145)
SODIUM UR-SCNC: 20 MMOL/L (ref 40–220)
VIT B12 SERPL-MCNC: 453 PG/ML (ref 232–1245)
WBC # BLD AUTO: 14 K/UL (ref 4.8–10.8)

## 2019-10-02 RX ADMIN — TEMAZEPAM SCH MG: 15 CAPSULE ORAL at 20:25

## 2019-10-02 RX ADMIN — DEXTROSE SCH MLS/HR: 50 INJECTION, SOLUTION INTRAVENOUS at 04:21

## 2019-10-02 RX ADMIN — SODIUM CHLORIDE SCH MLS/HR: 0.45 INJECTION, SOLUTION INTRAVENOUS at 20:37

## 2019-10-02 RX ADMIN — SENNOSIDES A AND B SCH MG: 8.6 TABLET, FILM COATED ORAL at 09:24

## 2019-10-02 RX ADMIN — Medication SCH EA: at 12:05

## 2019-10-02 RX ADMIN — SODIUM CHLORIDE SCH MLS/HR: 0.45 INJECTION, SOLUTION INTRAVENOUS at 15:15

## 2019-10-02 RX ADMIN — SODIUM CHLORIDE SCH MLS/HR: 0.45 INJECTION, SOLUTION INTRAVENOUS at 03:36

## 2019-10-02 RX ADMIN — Medication SCH MG: at 20:25

## 2019-10-02 RX ADMIN — INSULIN HUMAN PRN MLS/HR: 100 INJECTION, SOLUTION PARENTERAL at 09:32

## 2019-10-02 RX ADMIN — WHITE PETROLATUM SCH EA: 57 PASTE TOPICAL at 12:06

## 2019-10-02 RX ADMIN — SODIUM CHLORIDE SCH MLS/HR: 0.45 INJECTION, SOLUTION INTRAVENOUS at 09:26

## 2019-10-02 RX ADMIN — Medication SCH MG: at 09:00

## 2019-10-02 NOTE — NUR
Vitals taken; BP noted at 54/18, HR at 110, RR at 5 breaths per minute. Patient quietly 
resting. 

-------------------------------------------------------------------------------

Addendum: 10/03/19 at 0225 by Jorje Cardenas RN

-------------------------------------------------------------------------------

Visible chest rise and fall noted.

## 2019-10-02 NOTE — NUR
PATIENT GIVEN MORNING CARE. CATHETER CARE AND ORAL CARE. TOLERATED WELL. LARGE BM NOTED. 
WILL CONTINUE TO MONITOR

## 2019-10-02 NOTE — NUR
PATIENT LYING IN BED RESTING AT THIS TIME. NO SOB NOTED. VSS. NO RESPIRATORY DISTRESS NOTED. 
PATIENT LOCALIZES TO TOUCH WHEN TOUCHED. "NO" WILL CONTINUE TO MONITOR.

## 2019-10-02 NOTE — NUR
PT ARRIVED ON THE UNIT. RECEIVED HAND OFF REPORT FROM ICU RN. PT APPEARS STABLE AND IN NO 
APPARENT DISTRESS. PT HAS PERSONAL BELONGINGS LEFT HAND IV RUNNING MORPHINE IV DRIP 1/2 
NORMAL SALINE AND HEPARIN DRIP. PLACED MORPHINE DRIP IN MORPHINE LOCK BOX. PT HAS R NARE NG 
TUBE IN PLACE NOT TO SUCTION. PT HAS BARRON CATH IN PLACE. PT HAS 2L NASAL CANULA 02

-------------------------------------------------------------------------------

Addendum: 10/02/19 at 1934 by Trini Badillo RN

-------------------------------------------------------------------------------

ICU NURSE GAVE EXTRA MORPHINE IV BAG PLACED IN NARCOTIC LOCK BOX

## 2019-10-02 NOTE — NUR
*** SLP note ***



12:10.  SLP came to provide bedside swallow evaluation per Dr. Magalie Cid's order; 
however, per RN (Manuel), pt is now on morphine drip and comfort measures and is no longer 
indicated for bedside swallow evaluation.



Physician may reorder bedside swallow evaluation if pt's status improves markedly/warrants, 
as appropriate.

## 2019-10-02 NOTE — NUR
TRANSFERRED PATIENT TO [facility] VIA BED, ENDORSED CONTINUITY OF CARE TO [facility] [place]. NO SIGNS 
OF DISTRESS NOTED.

## 2019-10-02 NOTE — NUR
RECEIVED BEDSIDE REPORT FROM JESSICA NIGHT SHIFT RN, FOR CONTINUITY OF CARE. PATIENT OPENS 
EYES TO PAINFUL STIMULI, NONVERBAL. PATIENT SKIN IS NOT INTACT HAS SACRAL PRESSURE INJURY 
AND L.KNEE SKIN TEAR. HE IS SR ON MONITOR, FLACC 0, NASAL CANNULA 3LPM, BREATHING EVEN AND 
UNLABORED. PATIENT HAS NGT TO R.NARES. PATIENT HAS BARRON CATHETER IN PLACE. HOB IS SEMI 
CASTAÑEDA, BED LOCKED IN LOW POSITION.

## 2019-10-02 NOTE — NUR
md miguel neumann AT PATIENTS BEDSIDE. STATES NEEDS HIS AT HOME MEDICATIONS TO BE BROUGHT BY 
FAMILY AND AT LEAST ONE DOSE GIVEN TO PATIENT BEFORE HE IS EXTUBATED. AMBRSENTAN 5MG DAILY 
AND RIOCIGUAT 2.5 MG PO TID. MD STATES MAY USE AT HOME MEDICATIONS. MD AT PATIENT BEDSIDE.

-------------------------------------------------------------------------------

Addendum: 10/02/19 at 0555 by Guadalupe Rai RN

-------------------------------------------------------------------------------

WRONG PATIENT. WRONG CHARTING.

## 2019-10-02 NOTE — NUR
DR. VILLANUEVA AND RESIDENT PHYSICIANS AT BEDSIDE, UPDATED ON PATIENT'S CONDITION. SPEAKING WITH 
JEOVANNY LANDEROS, REGARDING PLAN FOR PATIENT.

## 2019-10-02 NOTE — NUR
DR LIEBERMAN AT PATIENT BEDSIDE. MADE AWARE OF HOLDING HEPARIN DRIP DUE TO PTT LAB VALUE AND 
PROTOCOL. MD AWARE. WILL CONTINUE TO MONITOR.

## 2019-10-02 NOTE — NUR
Received endorsement from AM shift RN; patient A/Ox0, aphasic, bedbound. Introduced self, 
updated board. On O2 running at 2LPM via nasal cannula. IV site noted on left hand, running 
NaCl 0.45 at 175mL/hr., Morphine at 5mL/hr., and Heparin at 5mL/hr. Skin non-intact; wound 
on sacral area and left knee. For comfort measures. Bed in the lowest position, call light 
within reach. Initial assessment done. Will continue to monitor. 

-------------------------------------------------------------------------------

Addendum: 10/03/19 at 0224 by Jorje Cardenas RN

-------------------------------------------------------------------------------

NG tube in place, no feeding and slater catheter in place.

## 2019-10-02 NOTE — NUR
CALLED RESIDENTS STATED CRITICAL VALUES CHARGE NURSE REC'D CALL AT [phone]

CALCIUM 8.3

SODIUM 160



CREATININE 8.7

## 2019-10-02 NOTE — NUR
PATIENT HAS BEEN SCREENED AND CATEGORIZED AS HIGH NUTRITION RISK. PATIENT WILL BE SEEN 
WITHIN 1-2 DAYS OF ADMISSION.



10/02/19-10/03/19



[address]

## 2019-10-02 NOTE — NUR
Dr. Jain made aware of critical values of elevated troponin 2.897 and aPTT 52.1. No new 
orders; patient currently on Heparin drip. Will continue to monitor.

## 2019-10-02 NOTE — NUR
Called Dr. Jain about oral medications due at 2100; told to hold all oral medications. 
Carried out.

## 2019-10-02 NOTE — NUR
RESIDENTS AT PATIENT BEDSIDE. AWARE OF LOW BP. CONTINUING TO TAKE BPS AT THIS TIME. NO 
CHANGES AT THIS TIME WILL CONTINUE TO MONITOR. MAP OK. MD AWARE.

## 2019-10-02 NOTE — NUR
PHONE CALL TO PHARMACIST, SPOKE WITH PETER REGARDING HEPARIN DRIP; VERIFIED W/PHARMACIST 
DOSAGE(7.5ML/HR)

## 2019-10-02 NOTE — NUR
WOUND CARE EVALUATION NOTE:

REASON FOR EVALUATION: SACRALCOCCYX WOUND

SKIN ASSESSMENT DONE WITH THIS 77 Y/O MALE PT ADMITTED TO [facility] WITH PRESSURE ULCER STAGE 2 
TO SACRALCOCCYX AREA. PT IS ASLEEP. SKIN IS WARM AND DRY, MULTIPLE ECCHYMOSIS TO UPPER 
EXTREMITIES, BLE NO HAIR GROWTH, NO EDEMA TO BILATERAL LOWER LEGS. BILATERAL DORSAL PEDAL 
PULSES PRESENT AND NORMAL. PLAN OF CARE DISCUSSED WITH PRIMARY RN.



INTEGUMENTARY:

- MULTIPLE ECCHYMOSIS TO UPPER EXTREMITIES SKIN DRY AND THIN

- SACRALCOCCYX PRESSURE ULCER STAGE 2, 1.5X1X0.1CM WOUND BED IS NON-BLANCHABLE RED, MOIST, 
MICHAEL-WOUND SKIN INTACT WITH SURROUNDING REDNESS 7X6CM INDICATED FURTHER DAMAGE.

-BILATERAL HEELS THIN CALLUSES



RECOMMENDATIONS:

-CLEANSE UPPER EXTREMITIES WITH WATER, PAT DRY, APPLY HYDRAGUARDGUARD BID AND PREM.

- CLEANSE SACRALCOCCYX WITH SOAP AND WATER, PAT DRY, APPLY Z GUARD  AND COVER WITH OPTIFOAM  
QD AND PRN SOILING 

-APPLY HEEL RAISER TO RIGHT HEEL AT ALL TIMES

-OFFLOAD BILATERAL HEELS BY PLACING PILLOWS UNDER CALVES UNLESS OTHERWISE CONTRAINDICATED

-PRESSURE REDISTRIBUTION SURFACE THERAPY

-TURN AND REPOSITION Q2H, OFFLOAD SACRALCOCCYX BY TURNING RIGHT AND LEFT

-CONTINUE TO FOLLOW RD RECOMMENDATIONS



ALL ABOVE RECOMMENDATIONS DISCUSSED WITH PRIMARY RN 

WILL FOLLOW UP PT Q7-10 DAYS. PLEASE CONTACT WOUND CARE NURSE FOR ANY QUESTION AND CHANGE OF 
WOUND CONDITION.

## 2019-10-03 VITALS — SYSTOLIC BLOOD PRESSURE: 54 MMHG | DIASTOLIC BLOOD PRESSURE: 18 MMHG

## 2019-10-03 VITALS — DIASTOLIC BLOOD PRESSURE: 23 MMHG | SYSTOLIC BLOOD PRESSURE: 49 MMHG

## 2019-10-03 VITALS — SYSTOLIC BLOOD PRESSURE: 52 MMHG | DIASTOLIC BLOOD PRESSURE: 24 MMHG

## 2019-10-03 RX ADMIN — SODIUM CHLORIDE SCH MLS/HR: 0.45 INJECTION, SOLUTION INTRAVENOUS at 14:49

## 2019-10-03 RX ADMIN — SENNOSIDES A AND B SCH MG: 8.6 TABLET, FILM COATED ORAL at 09:00

## 2019-10-03 RX ADMIN — Medication SCH EA: at 00:29

## 2019-10-03 RX ADMIN — SODIUM CHLORIDE SCH MLS/HR: 0.45 INJECTION, SOLUTION INTRAVENOUS at 08:25

## 2019-10-03 RX ADMIN — SODIUM CHLORIDE SCH MLS/HR: 0.45 INJECTION, SOLUTION INTRAVENOUS at 01:44

## 2019-10-03 RX ADMIN — INSULIN HUMAN PRN MLS/HR: 100 INJECTION, SOLUTION PARENTERAL at 03:56

## 2019-10-03 RX ADMIN — Medication SCH EA: at 12:19

## 2019-10-03 RX ADMIN — INSULIN HUMAN PRN MLS/HR: 100 INJECTION, SOLUTION PARENTERAL at 23:36

## 2019-10-03 RX ADMIN — WHITE PETROLATUM SCH EA: 57 PASTE TOPICAL at 00:29

## 2019-10-03 RX ADMIN — WHITE PETROLATUM SCH EA: 57 PASTE TOPICAL at 12:19

## 2019-10-03 NOTE — NUR
GENA ATIVAN GIVEN. NEW BAG OF MORPHINE NOW INFUSING. VS;51/25 HR 98 99% 2L O2 RR 8/MIN FLACC 
0. WILL CONTINUE TO MONITOR.

## 2019-10-03 NOTE — NUR
Pt resting in bed, RR = 5/min nonlabored with O2 @ 2Lpm via n/c. Cont IV morphine drip @ 
5mg/hr. FLACC 0. Will cont to monitor.

## 2019-10-03 NOTE — NUR
Pt repositioned. Castellanos cath intact & draining min clear yellow urine. Pt FLACC 0, RR = 5bpm 
even & nonlabored. Left forearm IV intact with ongoing 1/2 NS @ 175ml/hr, & morphine drip @ 
5mg/hr. Will cont to monitor.

## 2019-10-03 NOTE — NUR
Report given to pm nurse Ara. Pt resting in bed, FLACC 0, resp nonlabored. Left forearm 
IV intact with ongoing Morphine drip @ 5mg/hr.

## 2019-10-03 NOTE — NUR
RECEIVED BEDSIDE REPORT FROM DAY RN. PT IS NON RESPONSIVE TO VOICE. ON NC 2L O2. 
RESPIRATIONS ARE 5/MIN NON LABORED. B/P 50/23 HR 96 O2 99% ON 2L. PT WITH SACRAL WOUND 
DRESSING C/D/I. SCABS ON JESUS ALBERTO KNEES. IV ON L HAND 22G ON MORPHINE DRIP 5ML/H 1/2 NS AT 
20ML/H. PT IS DNR ON COMFORT MEASURES ONLY. BED ALARM ON AND BED LOWEST POSITION. WILL ROUND 
FREQUENTLY.

## 2019-10-03 NOTE — NUR
PATIENT IS RESTING IN BED. CHEST RISE AND FALL. RESPIRATIONS ARE SHALLOW 6/MIN. MORPHINE AT 
5ML/H. WILL CONTINUE TO MONITOR.

## 2019-10-03 NOTE — NUR
ASSESSMENT COMPLETED LOC RESPONSIVE TO RCP CALLING OF PATIENT'S NAME AND STERNUM RUB 
COMFORTABLY ON MOPHINE SULFATE DRIP 5 mg/hr SUPPLEMENTAL OXYGEN AT 2 LPM VIA NC SATURATION 
DESCENDING FROM 10/2 97% 10/1 95% RESPIRATIONS NOTED 4 TO 6 BPM SKIN TONE CYANOSIS NOTED 
PREVALENT TO ORAL/LIP REGION WITH FOREMENTIONED ASSESSMENT PATIENT GRAVITATING TOWARDS 
HYPOXEMIA 

-------------------------------------------------------------------------------

Addendum: 10/03/19 at 0942 by Mulugeta Jean RT

-------------------------------------------------------------------------------

COMFORTABLY = COMFORTABLE

## 2019-10-04 VITALS — DIASTOLIC BLOOD PRESSURE: 25 MMHG | SYSTOLIC BLOOD PRESSURE: 51 MMHG

## 2019-10-04 VITALS — SYSTOLIC BLOOD PRESSURE: 48 MMHG | DIASTOLIC BLOOD PRESSURE: 22 MMHG

## 2019-10-04 NOTE — NUR
Received call from Remington (son) & gave verbal consent to release pt's remains to [facility] 
 [facility] (: Erika [phone]). Informed Remington that we are still 
waiting for clearance from  & we will be contacting mortuary once remains are 
cleared.

## 2019-10-04 NOTE — NUR
ROUNDED ON PATIENT. FLACC 0. RR 6/MIN CHEST RISE AND FALL. SHALLOW BREATHS. WILL CONTINUE TO 
MONITOR.

## 2019-10-04 NOTE — NUR
GENA ATIVAN GIVEN PER ORDERS. ALL NEEDS MET AT THIS TIME. NO CHANGE IN PTS STATUS. WILL 
CONTINUE TO MONITOR.

## 2019-10-04 NOTE — NUR
PT RESTING COMFORTABLY IN BED. CHEST RISE AND FALL RR 5 99% ON 2L O2, 51/24, HR 99, FLACC 0 
97.2. WILL CONTINUE TO MONITOR.

## 2019-10-04 NOTE — NUR
Dr Cid in room, pronounced time of death @ 7711. Carole informed (case#[id number]); 
pt ineligible for organ donor. [place]  paged, awaiting call back. Remington Knox 
(son) notified on the phone of TOD, verbalized understanding & states he will notify 
mortuary to contact [facility].

## 2019-10-04 NOTE — NUR
Informed by monitor tech that pt is asystolic on tele. Assessed pt in room, no spontaneous 
breathing noted, non-responsive to auditory/tactile stimuli, pupils non-reactive 
bilaterally. Dr. Cid notified.

## 2019-10-04 NOTE — NUR
Pt resting in bed, RR = 12/min cheyne-villagomez pattern. FLACC 0. Left forearm IV intact with 
ongoing morphine drip @ 5mg/hr. HR = 32/min via tele.

## 2019-10-04 NOTE — NUR
PT IS RESTING COMFORTABLY IN BED WITH EYES CLOSED. CHEST RISE AND FALL WITH SHALLOW RR 
7/MIN.  98% ON 2L, 51/24, FLACC 0. WILL CONTINUE TO MONITOR.

## 2019-10-04 NOTE — NUR
Received report from night shift nurse. Pt in be resting. No signs of distress. Call light 
within reach

## 2019-10-04 NOTE — NUR
Received call back from Natalia Bright from 's office. Report given. Per Natalia, pt is 
cleared for mortuary  (case # [id number]). Erika from [facility] 
notified of  clearance, states ETA for pick-up of remains is between 45min-2hr. Pt's 
body remains in room 119B. Left forearm IV removed, catheter intact. Sponge bath provided & 
covered with white sheet.
